# Patient Record
(demographics unavailable — no encounter records)

---

## 2025-06-03 NOTE — CONSULT LETTER
[Today's Date] : [unfilled] [Name] : Name: [unfilled] [] : : ~~ [Today's Date:] : [unfilled] [Dear  ___:] : Dear Dr. [unfilled]: [Consult] : I had the pleasure of evaluating your patient, [unfilled]. My full evaluation follows. [Consult - Single Provider] : Thank you very much for allowing me to participate in the care of this patient. If you have any questions, please do not hesitate to contact me. [Sincerely,] : Sincerely, [FreeTextEntry4] :  Lidia Izaguirre, DO [FreeTextEntry5] : [address] [facility] [FreeTextEntry6] : [place] [address] [de-identified] : Gold Aguilera MD, FFAC, FFAP, MASOUDE Pediatric Cardiology [facility]

## 2025-06-03 NOTE — CARDIOLOGY SUMMARY
[Today's Date] : [unfilled] [LVSF ___%] : LV Shortening Fraction [unfilled]% [FreeTextEntry1] : For chest pain Normal sinus rhythm, normal QRS axis, normal intervals (QTc 381 msec), no hypertrophy, no pre-excitation, no ST segment or T wave abnormalities. Normal EKG for age.  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  LV dimensions and shortening fraction were normal.  No pericardial effusion.  Need repeat echo to confirm

## 2025-06-03 NOTE — REASON FOR VISIT
[Initial Evaluation] : an initial evaluation of [Chest Pain] : chest pain [Dizziness/Lightheadedness] : dizziness/lightheadedness [Mother] : mother [Language Line ] : provided by Language [employer]   [Interpreters_IDNumber] : [id number] [Interpreters_FullName] : Vikash [TWNoteComboBox1] : Croatian

## 2025-06-03 NOTE — CONSULT LETTER
[Today's Date] : [unfilled] [Name] : Name: [unfilled] [] : : ~~ [Today's Date:] : [unfilled] [Dear  ___:] : Dear Dr. [unfilled]: [Consult] : I had the pleasure of evaluating your patient, [unfilled]. My full evaluation follows. [Consult - Single Provider] : Thank you very much for allowing me to participate in the care of this patient. If you have any questions, please do not hesitate to contact me. [Sincerely,] : Sincerely, [FreeTextEntry4] :  Lidia Izaguirre, DO [FreeTextEntry5] : [address] [facility] [FreeTextEntry6] : [place] [address] [de-identified] : Gold Aguilera MD, FFAC, FFAP, MASOUDE Pediatric Cardiology [facility]

## 2025-06-03 NOTE — HISTORY OF PRESENT ILLNESS
[FreeTextEntry1] : NANCIE is a 15-year-old male who was referred for cardiology consultation due to chest pain. The chest pain began 3 years ago, and since then has been occurring 2 times a month. The pain is left and center chest in location, is described as sharp, 5-8/10 in severity, and does not radiate. The pain occurs at rest and during exercise, lasts approximately 1-2 minutes, and resolves spontaneously. The chest pain is not worse with palpation, movement and inspiration. NANCIE has tried no medication to relieve the symptoms. The chest pain is not associated with palpitations, shortness of breath, diaphoresis, lightheadedness, or nausea. NANCIE has never had syncope. There has been no recent change in activity level, no fatigue, and no difficulty gaining weight or weight loss. He is active in basketball and gym class and has had no recent decrease in exercise endurance.  He has poor drinking and eating habits. Importantly, there is no family history of premature sudden death, cardiomyopathy, arrhythmia, drowning, or unexplained accidental deaths.

## 2025-06-03 NOTE — CONSULT LETTER
[Today's Date] : [unfilled] [Name] : Name: [unfilled] [] : : ~~ [Today's Date:] : [unfilled] [Dear  ___:] : Dear Dr. [unfilled]: [Consult] : I had the pleasure of evaluating your patient, [unfilled]. My full evaluation follows. [Consult - Single Provider] : Thank you very much for allowing me to participate in the care of this patient. If you have any questions, please do not hesitate to contact me. [Sincerely,] : Sincerely, [FreeTextEntry4] :  Lidia Izaguirre, DO [FreeTextEntry5] : [address] [facility] [FreeTextEntry6] : [place] [address] [de-identified] : Gold Aguilera MD, FFAC, FFAP, MASOUDE Pediatric Cardiology [facility]

## 2025-06-03 NOTE — REASON FOR VISIT
[Initial Evaluation] : an initial evaluation of [Chest Pain] : chest pain [Dizziness/Lightheadedness] : dizziness/lightheadedness [Mother] : mother [Language Line ] : provided by Language Line   [Interpreters_IDNumber] : [id number] [Interpreters_FullName] : Vikash [TWNoteComboBox1] : Ethiopian

## 2025-06-03 NOTE — DISCUSSION/SUMMARY
[PE + No Restrictions] : [unfilled] may participate in the entire physical education program without restriction, including all varsity competitive sports. [FreeTextEntry1] : In summary, NANCIE is a 15-year male with chest pain during rest and risk.  I discussed at length with the family that these symptoms are not likely related to cardiac pathology. We discussed the more common causes of chest pain in children, including musculoskeletal pain, pulmonary conditions such as asthma, and gastrointestinal conditions such as reflux.  He is likely feeling musculoskeletal chest pain. He should maintain good hydration. He should drink about 64 ounces of non-caffeinated beverages per day. Caffeinated beverages should be avoided. His fluid intake should be titrated to keep his urine dilute. He needs to have at least 2 meals a day. and snacks.  His echo showed suspicion for anomalous origin of the right coronary artery with intramural course. He needs to have a repeat echo to confirm coronary artery anomaly and may need CT Angio and stress thallium to see if the chest pain is cardiac related. If these are abnormal, he may need cardiac surgery to correct the abnormality Mutiple attempts were made to contact the family and those call were not answered, and messages were not returned.  Routine pediatric cardiology follow-up is pending above testing ASAP, earlier if there are recurrent episodes of chest pain which do not appear to be musculoskeletal, or if there are any other cardiac concerns.  The family verbalized understanding, and all questions were answered.  [Needs SBE Prophylaxis] : [unfilled] does not need bacterial endocarditis prophylaxis [PE + No Strenuous Varsity Sports] : [unfilled] may participate in the regular physical education program, however, NO VARSITY COMPETITIVE SPORTS where there is strenuous trainng and prolonged physical exertion ( e.g. football, hockey, wrestling, lacrosse, soccer and basketball). Less strenuous sports such as baseball and golf are acceptable at the varsity level. [Influenza vaccine is recommended] : Influenza vaccine is recommended

## 2025-06-03 NOTE — REASON FOR VISIT
[Initial Evaluation] : an initial evaluation of [Chest Pain] : chest pain [Dizziness/Lightheadedness] : dizziness/lightheadedness [Mother] : mother [Language Line ] : provided by Language Line   [Interpreters_IDNumber] : [id number] [Interpreters_FullName] : Vikash [TWNoteComboBox1] : Cape Verdean